# Patient Record
Sex: MALE | Race: WHITE | Employment: UNEMPLOYED | URBAN - METROPOLITAN AREA
[De-identification: names, ages, dates, MRNs, and addresses within clinical notes are randomized per-mention and may not be internally consistent; named-entity substitution may affect disease eponyms.]

---

## 2017-12-27 NOTE — PATIENT DISCUSSION
(H26.931) Other secondary cataract, right eye - Assesment : Significant posterior capsule opacification present. - Plan : YAG OD

## 2017-12-27 NOTE — PATIENT DISCUSSION
(U60.450) Vitreous degeneration, bilateral - Assesment : Examination revealed PVD - Plan : Monitor for changes. Advised patient to call our office with decreased vision or an increase in flashes and/or floaters.

## 2017-12-27 NOTE — PATIENT DISCUSSION
(H40.013) Open angle with borderline findings, low risk, bilateral - Assesment : Examination revealed suspicion for Open Angle Glaucoma.  BASED ON C/D , INCREASED OU  IOP OU WITHIN NORMAL LIMITS TODAY  ADVISED PATIENT SUSPICIOUS FOR GLAUCOMA, - Plan : 6 WEEKS, 24-2, ON OCT , GONIO , PACHYMETRY

## 2018-02-05 NOTE — PATIENT DISCUSSION
(H40.013) Open angle with borderline findings, low risk, bilateral - Assesment : Examination revealed suspicion for Open Angle Glaucoma.  BASED ON C/D , INCREASED OU  IOP OU WITHIN NORMAL LIMITS TODAY  ADVISED PATIENT SUSPICIOUS FOR GLAUCOMA, - Plan : OBSERVATION  12/2018 EXAM/ ON OCT

## 2018-12-17 NOTE — PATIENT DISCUSSION
(K61.599) Dermatochalasis of right upper eyelid - Assesment : Examination revealed Dermatochalasis - Plan : Monitor for changes. Advised patient to call our office with decreased vision or increased symptoms.

## 2018-12-17 NOTE — PATIENT DISCUSSION
(H40.013) Open angle with borderline findings, low risk, bilateral - Assesment : Examination revealed suspicion for Open Angle Glaucoma.  BASED ON C/D , INCREASED OU  IOP OU WITHIN NORMAL LIMITS TODAY - Plan : OBSERVATION  CALL WITH DECREASED VISION 1 YEAR EXAM/24-2/ ON PHOTOS

## 2018-12-17 NOTE — PATIENT DISCUSSION
(A20.237) Dermatochalasis of left upper eyelid - Assesment : Examination revealed Dermatochalasis - Plan : Monitor for changes. Advised patient to call our office with decreased vision or increased symptoms.

## 2022-04-20 ENCOUNTER — NEW PATIENT (OUTPATIENT)
Dept: URBAN - METROPOLITAN AREA CLINIC 27 | Facility: CLINIC | Age: 52
End: 2022-04-20

## 2022-04-20 DIAGNOSIS — Z46.0: ICD-10-CM

## 2022-04-20 DIAGNOSIS — H53.031: ICD-10-CM

## 2022-04-20 DIAGNOSIS — H50.00: ICD-10-CM

## 2022-04-20 DIAGNOSIS — H52.03: ICD-10-CM

## 2022-04-20 PROCEDURE — 92310-3 LEVEL 3 CONTACT LENS MANAGEMENT

## 2022-04-20 PROCEDURE — 92015 DETERMINE REFRACTIVE STATE: CPT

## 2022-04-20 PROCEDURE — 92004 COMPRE OPH EXAM NEW PT 1/>: CPT

## 2022-04-20 ASSESSMENT — KERATOMETRY
OD_K1POWER_DIOPTERS: 44.00
OD_K2POWER_DIOPTERS: 45.00
OD_AXISANGLE2_DEGREES: 124
OS_AXISANGLE2_DEGREES: 32
OS_K1POWER_DIOPTERS: 44.00
OD_AXISANGLE_DEGREES: 034
OS_K2POWER_DIOPTERS: 44.00
OS_AXISANGLE_DEGREES: 122

## 2022-04-20 ASSESSMENT — VISUAL ACUITY
OD_SC: J7
OS_SC: J5
OS_SC: 20/25
OD_SC: 20/60

## 2022-04-20 ASSESSMENT — TONOMETRY
OD_IOP_MMHG: 16
OS_IOP_MMHG: 14

## 2022-04-20 NOTE — PATIENT DISCUSSION
Advised that eyeglasses are best option. Patient wants to have both contacts and eyeglasses updated. Lost eyeglasses and out of contact lenses currently only using over-the-counter readers.

## 2022-04-27 ENCOUNTER — CONTACT LENSES/GLASSES VISIT (OUTPATIENT)
Dept: URBAN - METROPOLITAN AREA CLINIC 27 | Facility: CLINIC | Age: 52
End: 2022-04-27

## 2022-04-27 DIAGNOSIS — H50.00: ICD-10-CM

## 2022-04-27 DIAGNOSIS — H53.031: ICD-10-CM

## 2022-04-27 DIAGNOSIS — H52.03: ICD-10-CM

## 2022-04-27 DIAGNOSIS — Z46.0: ICD-10-CM

## 2022-04-27 PROCEDURE — 92310F

## 2022-04-27 ASSESSMENT — KERATOMETRY
OD_K2POWER_DIOPTERS: 45.00
OD_K1POWER_DIOPTERS: 44.00
OD_AXISANGLE2_DEGREES: 124
OS_AXISANGLE_DEGREES: 122
OS_K2POWER_DIOPTERS: 44.00
OS_AXISANGLE2_DEGREES: 32
OS_K1POWER_DIOPTERS: 44.00
OD_AXISANGLE_DEGREES: 034

## 2022-04-27 ASSESSMENT — VISUAL ACUITY
OU_CC: 20/20
OU_CC: J2+3

## 2022-04-27 NOTE — PATIENT DISCUSSION
The contact lens form was reviewed in detail and signed. Patient was able to insert and remove. Successful one day soft disposable OS distance, OD near fitting. Because of strabismus, patient may elect to use over-the-counter readers in order to maximize near vision using OS.